# Patient Record
Sex: FEMALE | Race: WHITE | URBAN - METROPOLITAN AREA
[De-identification: names, ages, dates, MRNs, and addresses within clinical notes are randomized per-mention and may not be internally consistent; named-entity substitution may affect disease eponyms.]

---

## 2017-08-11 ENCOUNTER — APPOINTMENT (OUTPATIENT)
Dept: URBAN - METROPOLITAN AREA CLINIC 292 | Age: 27
Setting detail: DERMATOLOGY
End: 2017-08-11

## 2017-08-11 DIAGNOSIS — L81.0 POSTINFLAMMATORY HYPERPIGMENTATION: ICD-10-CM

## 2017-08-11 DIAGNOSIS — L72.0 EPIDERMAL CYST: ICD-10-CM

## 2017-08-11 DIAGNOSIS — L70.0 ACNE VULGARIS: ICD-10-CM

## 2017-08-11 PROCEDURE — OTHER PRESCRIPTION: OTHER

## 2017-08-11 PROCEDURE — OTHER INTRALESIONAL KENALOG: OTHER

## 2017-08-11 PROCEDURE — 11901 INJECT SKIN LESIONS >7: CPT

## 2017-08-11 PROCEDURE — OTHER ACNE SURGERY (MULTIPLE LESIONS): OTHER

## 2017-08-11 PROCEDURE — 10040 ACNE SURGERY: CPT

## 2017-08-11 PROCEDURE — 99202 OFFICE O/P NEW SF 15 MIN: CPT | Mod: 25

## 2017-08-11 PROCEDURE — OTHER AFA CHEMICAL PEEL: OTHER

## 2017-08-11 PROCEDURE — OTHER COUNSELING: OTHER

## 2017-08-11 RX ORDER — DOXYCYCLINE 100 MG/1
CAPSULE ORAL
Qty: 60 | Refills: 3 | Status: ERX | COMMUNITY
Start: 2017-08-11

## 2017-08-11 RX ORDER — TRETINOIN 0.25 MG/G
CREAM TOPICAL
Qty: 1 | Refills: 5 | Status: ERX | COMMUNITY
Start: 2017-08-11

## 2017-08-11 RX ORDER — CLINDAMYCIN PHOSPHATE 10 MG/ML
SOLUTION TOPICAL
Qty: 1 | Refills: 5 | Status: ERX | COMMUNITY
Start: 2017-08-11

## 2017-08-11 RX ORDER — BENZOYL PEROXIDE 5 G/100G
GEL TOPICAL
Qty: 1 | Refills: 5 | Status: ERX | COMMUNITY
Start: 2017-08-11

## 2017-08-11 ASSESSMENT — LOCATION SIMPLE DESCRIPTION DERM
LOCATION SIMPLE: RIGHT CHEEK
LOCATION SIMPLE: LEFT CHEEK
LOCATION SIMPLE: CHIN

## 2017-08-11 ASSESSMENT — LOCATION DETAILED DESCRIPTION DERM
LOCATION DETAILED: RIGHT MEDIAL MANDIBULAR CHEEK
LOCATION DETAILED: RIGHT CHIN
LOCATION DETAILED: LEFT CHIN
LOCATION DETAILED: LEFT INFERIOR CENTRAL MALAR CHEEK
LOCATION DETAILED: RIGHT MENTAL CREASE
LOCATION DETAILED: LEFT INFERIOR CENTRAL BUCCAL CHEEK
LOCATION DETAILED: LEFT CENTRAL BUCCAL CHEEK
LOCATION DETAILED: LEFT CENTRAL MALAR CHEEK
LOCATION DETAILED: RIGHT LATERAL BUCCAL CHEEK

## 2017-08-11 ASSESSMENT — LOCATION ZONE DERM: LOCATION ZONE: FACE

## 2017-08-11 NOTE — PROCEDURE: AFA CHEMICAL PEEL
Consent: Prior to the procedure, written consent was obtained and risks were reviewed, including but not limited to: redness, peeling, blistering, pigmentary change, scarring, infection, and pain.
Post Peel Care: After the procedure, a post-peel cream was applied to the treated areas. Sun protection and post-care instructions were reviewed with the patient.
Post-Care Instructions: I reviewed with the patient in detail post-care instructions. Patient should avoid sun exposure and wear sun protection.
Treatment Number: 0
Prep: The treated area was degreased with pre-peel cleanser, and vaseline was applied for protection of mucous membranes.
Chemical Peel: AFA Facial Peel-30
Detail Level: Zone

## 2017-08-11 NOTE — PROCEDURE: INTRALESIONAL KENALOG
Medical Necessity Clause: This procedure was medically necessary because the lesions that were treated were:
X Size Of Lesion In Cm (Optional): 0
Kenalog Preparation: Kenalog
Consent: The risks of atrophy were reviewed with the patient.
Include Z78.9 (Other Specified Conditions Influencing Health Status) As An Associated Diagnosis?: No
Concentration Of Solution Injected (Mg/Ml): 2.5
Total Volume Injected (Ccs- Only Use Numbers And Decimals): .8
Detail Level: Detailed

## 2017-08-11 NOTE — PROCEDURE: ACNE SURGERY (MULTIPLE LESIONS)
Acne Type: Comedonal Lesions
Detail Level: Detailed
Post-Care Instructions: I reviewed with the patient in detail post-care instructions. Patient is to keep the treatment areas dry overnight, and then apply bacitracin twice daily until healed. Patient may apply hydrogen peroxide soaks to remove any crusting.
Prep Text (Optional): Prior to removal the treatment areas were prepped in the usual fashion.
Consent was obtained and risks were reviewed including but not limited to scarring, infection, bleeding, scabbing, incomplete removal, and allergy to anesthesia.
Render Post-Care Instructions In Note?: no
Extraction Method: 11 blade and comedone extractor
Total Number Of Lesions Treated: 5

## 2017-09-15 ENCOUNTER — APPOINTMENT (OUTPATIENT)
Dept: URBAN - METROPOLITAN AREA CLINIC 292 | Age: 27
Setting detail: DERMATOLOGY
End: 2017-09-15

## 2017-09-15 DIAGNOSIS — L72.0 EPIDERMAL CYST: ICD-10-CM

## 2017-09-15 DIAGNOSIS — L70.0 ACNE VULGARIS: ICD-10-CM

## 2017-09-15 DIAGNOSIS — L81.0 POSTINFLAMMATORY HYPERPIGMENTATION: ICD-10-CM

## 2017-09-15 PROCEDURE — 11901 INJECT SKIN LESIONS >7: CPT

## 2017-09-15 PROCEDURE — 99213 OFFICE O/P EST LOW 20 MIN: CPT | Mod: 25

## 2017-09-15 PROCEDURE — OTHER COUNSELING: OTHER

## 2017-09-15 PROCEDURE — 10040 ACNE SURGERY: CPT

## 2017-09-15 PROCEDURE — OTHER ACNE SURGERY (MULTIPLE LESIONS): OTHER

## 2017-09-15 PROCEDURE — OTHER INTRALESIONAL KENALOG: OTHER

## 2017-09-15 PROCEDURE — OTHER AFA CHEMICAL PEEL: OTHER

## 2017-09-15 PROCEDURE — OTHER PRESCRIPTION: OTHER

## 2017-09-15 RX ORDER — BENZOYL PEROXIDE 100 MG/ML
LIQUID TOPICAL
Qty: 1 | Refills: 4 | Status: ERX | COMMUNITY
Start: 2017-09-15

## 2017-09-15 ASSESSMENT — LOCATION DETAILED DESCRIPTION DERM
LOCATION DETAILED: RIGHT LATERAL BUCCAL CHEEK
LOCATION DETAILED: RIGHT CHIN
LOCATION DETAILED: LEFT CENTRAL BUCCAL CHEEK
LOCATION DETAILED: LEFT INFERIOR CENTRAL BUCCAL CHEEK
LOCATION DETAILED: LEFT CENTRAL MALAR CHEEK
LOCATION DETAILED: RIGHT MEDIAL MANDIBULAR CHEEK
LOCATION DETAILED: LEFT CHIN
LOCATION DETAILED: LEFT INFERIOR CENTRAL MALAR CHEEK
LOCATION DETAILED: RIGHT MENTAL CREASE

## 2017-09-15 ASSESSMENT — LOCATION ZONE DERM: LOCATION ZONE: FACE

## 2017-09-15 ASSESSMENT — LOCATION SIMPLE DESCRIPTION DERM
LOCATION SIMPLE: LEFT CHEEK
LOCATION SIMPLE: CHIN
LOCATION SIMPLE: RIGHT CHEEK

## 2017-09-15 NOTE — PROCEDURE: AFA CHEMICAL PEEL
Consent: Prior to the procedure, written consent was obtained and risks were reviewed, including but not limited to: redness, peeling, blistering, pigmentary change, scarring, infection, and pain.
Post Peel Care: After the procedure, a post-peel cream was applied to the treated areas. Sun protection and post-care instructions were reviewed with the patient.
Post-Care Instructions: I reviewed with the patient in detail post-care instructions. Patient should avoid sun exposure and wear sun protection.
Detail Level: Zone
Chemical Peel: AFA Facial Peel-30
Treatment Number: 0
Prep: The treated area was degreased with pre-peel cleanser, and vaseline was applied for protection of mucous membranes.

## 2017-09-15 NOTE — PROCEDURE: INTRALESIONAL KENALOG
X Size Of Lesion In Cm (Optional): 0
Kenalog Preparation: Kenalog
Total Volume Injected (Ccs- Only Use Numbers And Decimals): .8
Consent: The risks of atrophy were reviewed with the patient.
Concentration Of Solution Injected (Mg/Ml): 2.5
Detail Level: Detailed
Medical Necessity Clause: This procedure was medically necessary because the lesions that were treated were:
Include Z78.9 (Other Specified Conditions Influencing Health Status) As An Associated Diagnosis?: No

## 2017-11-10 ENCOUNTER — APPOINTMENT (OUTPATIENT)
Dept: URBAN - METROPOLITAN AREA CLINIC 292 | Age: 27
Setting detail: DERMATOLOGY
End: 2017-11-10

## 2017-11-10 DIAGNOSIS — L70.0 ACNE VULGARIS: ICD-10-CM

## 2017-11-10 DIAGNOSIS — L81.0 POSTINFLAMMATORY HYPERPIGMENTATION: ICD-10-CM

## 2017-11-10 DIAGNOSIS — L72.0 EPIDERMAL CYST: ICD-10-CM

## 2017-11-10 PROCEDURE — 10040 ACNE SURGERY: CPT

## 2017-11-10 PROCEDURE — OTHER COUNSELING: OTHER

## 2017-11-10 PROCEDURE — OTHER PRESCRIPTION: OTHER

## 2017-11-10 PROCEDURE — OTHER ACNE SURGERY (MULTIPLE LESIONS): OTHER

## 2017-11-10 PROCEDURE — 99213 OFFICE O/P EST LOW 20 MIN: CPT | Mod: 25

## 2017-11-10 PROCEDURE — OTHER CHEMICAL PEEL GLYCOLIC ACID: OTHER

## 2017-11-10 RX ORDER — DOXYCYCLINE 100 MG/1
CAPSULE ORAL
Qty: 60 | Refills: 3 | Status: ERX

## 2017-11-10 ASSESSMENT — LOCATION DETAILED DESCRIPTION DERM
LOCATION DETAILED: LEFT CENTRAL MALAR CHEEK
LOCATION DETAILED: LEFT INFERIOR CENTRAL MALAR CHEEK
LOCATION DETAILED: SUPERIOR MID FOREHEAD

## 2017-11-10 ASSESSMENT — LOCATION SIMPLE DESCRIPTION DERM
LOCATION SIMPLE: LEFT CHEEK
LOCATION SIMPLE: SUPERIOR FOREHEAD

## 2017-11-10 ASSESSMENT — LOCATION ZONE DERM: LOCATION ZONE: FACE

## 2017-11-10 NOTE — PROCEDURE: ACNE SURGERY (MULTIPLE LESIONS)
Detail Level: Detailed
Extraction Method: 11 blade and comedone extractor
Total Number Of Lesions Treated: 5
Acne Type: Comedonal Lesions
Render Number Of Lesions Treated: no
Consent was obtained and risks were reviewed including but not limited to scarring, infection, bleeding, scabbing, incomplete removal, and allergy to anesthesia.
Post-Care Instructions: I reviewed with the patient in detail post-care instructions. Patient is to keep the treatment areas dry overnight, and then apply bacitracin twice daily until healed. Patient may apply hydrogen peroxide soaks to remove any crusting.
Prep Text (Optional): Prior to removal the treatment areas were prepped in the usual fashion.

## 2017-11-10 NOTE — PROCEDURE: CHEMICAL PEEL GLYCOLIC ACID
Detail Level: Zone
Prep: The treated area was degreased with pre-peel cleanser, and vaseline was applied for protection of mucous membranes.
Time (Mins): 1
Treatment Number: 0
Post Peel Care: The peel was neutralized with sodium bicarbonate.  After the procedure, the treatment area was washed with soap and water, and a post-peel cream was applied. Sun protection and post-care instructions were reviewed with the patient.
Consent: Prior to the procedure, written consent was obtained and risks were reviewed, including but not limited to: redness, peeling, blistering, pigmentary change, scarring, infection, and pain.
Glycolic Acid %: 30%
Post-Care Instructions: I reviewed with the patient in detail post-care instructions. Patient should avoid sun exposure and wear sun protection.

## 2021-02-20 ENCOUNTER — HOSPITAL ENCOUNTER (EMERGENCY)
Age: 31
Discharge: HOME | End: 2021-02-20
Attending: EMERGENCY MEDICINE
Payer: COMMERCIAL

## 2021-02-20 ENCOUNTER — APPOINTMENT (OUTPATIENT)
Dept: GENERAL RADIOLOGY | Age: 31
End: 2021-02-20
Payer: COMMERCIAL

## 2021-02-20 VITALS
SYSTOLIC BLOOD PRESSURE: 106 MMHG | DIASTOLIC BLOOD PRESSURE: 76 MMHG | OXYGEN SATURATION: 100 % | BODY MASS INDEX: 19.7 KG/M2 | RESPIRATION RATE: 16 BRPM | HEIGHT: 68 IN | HEART RATE: 87 BPM | TEMPERATURE: 97.9 F | WEIGHT: 130 LBS

## 2021-02-20 DIAGNOSIS — N94.6 DYSMENORRHEA: ICD-10-CM

## 2021-02-20 DIAGNOSIS — K59.00 CONSTIPATION, UNSPECIFIED CONSTIPATION TYPE: ICD-10-CM

## 2021-02-20 DIAGNOSIS — R10.9 NONSPECIFIC ABDOMINAL PAIN: Primary | ICD-10-CM

## 2021-02-20 LAB
AMORPHOUS: ABNORMAL
BACTERIA: ABNORMAL
BILIRUBIN URINE: ABNORMAL
BLOOD, URINE: ABNORMAL
CASTS UA: ABNORMAL /LPF
CHARACTER, URINE: ABNORMAL
COLOR: ABNORMAL
CRYSTALS, UA: ABNORMAL
EPITHELIAL CELLS, UA: ABNORMAL /HPF
GLUCOSE, URINE: NEGATIVE MG/DL
ICTOTEST: NEGATIVE
KETONES, URINE: ABNORMAL
LEUKOCYTE ESTERASE, URINE: NEGATIVE
MUCUS: ABNORMAL
NITRITE, URINE: NEGATIVE
PH UA: 5.5 (ref 5–9)
PREGNANCY, URINE: NEGATIVE
PROTEIN UA: 100 MG/DL
RBC UA: ABNORMAL /HPF
REFLEX TO URINE C & S: ABNORMAL
SPECIFIC GRAVITY UA: >= 1.03 (ref 1–1.03)
UROBILINOGEN, URINE: 0.2 EU/DL (ref 0–1)
WBC UA: ABNORMAL /HPF

## 2021-02-20 PROCEDURE — 6370000000 HC RX 637 (ALT 250 FOR IP): Performed by: EMERGENCY MEDICINE

## 2021-02-20 PROCEDURE — 74018 RADEX ABDOMEN 1 VIEW: CPT

## 2021-02-20 PROCEDURE — 81001 URINALYSIS AUTO W/SCOPE: CPT

## 2021-02-20 PROCEDURE — 99285 EMERGENCY DEPT VISIT HI MDM: CPT

## 2021-02-20 PROCEDURE — 84703 CHORIONIC GONADOTROPIN ASSAY: CPT

## 2021-02-20 RX ORDER — FERROUS SULFATE 325(65) MG
325 TABLET ORAL
COMMUNITY
End: 2022-09-20

## 2021-02-20 RX ORDER — IBUPROFEN 800 MG/1
800 TABLET ORAL ONCE
Status: COMPLETED | OUTPATIENT
Start: 2021-02-20 | End: 2021-02-20

## 2021-02-20 RX ORDER — FLUOXETINE HYDROCHLORIDE 20 MG/1
20 CAPSULE ORAL DAILY
COMMUNITY
End: 2022-09-20

## 2021-02-20 RX ORDER — POLYETHYLENE GLYCOL 3350 17 G/17G
17 POWDER, FOR SOLUTION ORAL DAILY
Qty: 1 BOTTLE | Refills: 0 | Status: SHIPPED | OUTPATIENT
Start: 2021-02-20 | End: 2021-02-27

## 2021-02-20 RX ADMIN — IBUPROFEN 800 MG: 800 TABLET, FILM COATED ORAL at 12:54

## 2021-02-20 ASSESSMENT — PAIN DESCRIPTION - FREQUENCY
FREQUENCY: CONTINUOUS
FREQUENCY: CONTINUOUS

## 2021-02-20 ASSESSMENT — ENCOUNTER SYMPTOMS
ABDOMINAL PAIN: 1
DIARRHEA: 0
NAUSEA: 0
SHORTNESS OF BREATH: 0
VOMITING: 0
BLOOD IN STOOL: 0
WHEEZING: 0
SORE THROAT: 0

## 2021-02-20 ASSESSMENT — PAIN DESCRIPTION - LOCATION
LOCATION: ABDOMEN
LOCATION: ABDOMEN

## 2021-02-20 ASSESSMENT — PAIN DESCRIPTION - ORIENTATION: ORIENTATION: LOWER

## 2021-02-20 ASSESSMENT — PAIN - FUNCTIONAL ASSESSMENT: PAIN_FUNCTIONAL_ASSESSMENT: 0-10

## 2021-02-20 ASSESSMENT — PAIN SCALES - GENERAL
PAINLEVEL_OUTOF10: 2
PAINLEVEL_OUTOF10: 2

## 2021-02-20 ASSESSMENT — PAIN DESCRIPTION - DIRECTION
RADIATING_TOWARDS: LOWER BACK
RADIATING_TOWARDS: LOWER BACK

## 2021-02-20 ASSESSMENT — PAIN DESCRIPTION - PAIN TYPE: TYPE: ACUTE PAIN

## 2021-02-20 NOTE — ED TRIAGE NOTES
Arrives to [facility] for the evaluation of lower abdominal cramping that started yesterday and worsening. Patient is on her last day of menstrual cycle. Denies fever, chills, diarrhea, nausea, vomiting, dysuria, foul smelling odor, flank pain, hematuria, pregnancy. Pain is rated 6/10 on scale and described as a constant cramping. Pain wraps around sides and into lower back. Has not taken any OTC pain relievers. Is passing gas but unsure of her last BM. States that sometimes when nervous will get an upset stomach. Is nervous about starting a new job soon. Did do some physical labor yesterday but states was not heavy lifting or chance of injury. Resting on cot at this time. Instructed patient that a urine specimen is needed. Call light in reach. Will monitor.

## 2021-02-20 NOTE — ED NOTES
Gave urine specimen, KUB completed. Received oral mediation for pain. At this time waiting results. Call light in reach. Patient declined warm blanket when offered. Will monitor.       Joe Rosario RN  02/20/21 8273

## 2021-02-20 NOTE — ED PROVIDER NOTES
[address]  [address] [place] [address] [address]  Phone: [phone]    eMERGENCY dEPARTMENT eNCOUnter           CHIEF COMPLAINT       Chief Complaint   Patient presents with    Abdominal Cramping       Nurses Notes reviewed and I agree except as noted in the HPI. HISTORY OF PRESENT ILLNESS    Aman Worley is a 35 y.o. female who presented via private vehicle with a chief complaint mentioned above. Symptoms started 2 days ago. She is complaining of mild intermittent sharp cramping across the lower abdomen and suprapubic area. She said that her pain was not relieved or exacerbated by anything. She is on her last day of her menstrual period. She also has constipation. Her last bowel movement was 4 days ago. She denies fever or chills. She denies nausea or vomiting. REVIEW OF SYSTEMS     Review of Systems   Constitutional: Negative for chills and fever. HENT: Negative for sore throat. Respiratory: Negative for shortness of breath and wheezing. Cardiovascular: Negative for chest pain and palpitations. Gastrointestinal: Positive for abdominal pain. Negative for blood in stool, diarrhea, nausea and vomiting. Genitourinary: Positive for vaginal bleeding. Negative for dysuria and hematuria. Musculoskeletal: Negative for neck pain and neck stiffness. Neurological: Negative for seizures, syncope and headaches. Psychiatric/Behavioral: Negative for confusion. PAST MEDICAL HISTORY    has a past medical history of Anxiety and Depression. SURGICAL HISTORY      has no past surgical history on file. CURRENT MEDICATIONS       Previous Medications    FERROUS SULFATE (IRON 325) 325 (65 FE) MG TABLET    Take 325 mg by mouth daily (with breakfast)    FLUOXETINE (PROZAC) 20 MG CAPSULE    Take 20 mg by mouth daily       ALLERGIES     has No Known Allergies. FAMILY HISTORY     has no family status information on file.     family history is not on file.    SOCIAL HISTORY      reports that she has never smoked. She has never used smokeless tobacco. She reports previous alcohol use. She reports that she does not use drugs. PHYSICAL EXAM     INITIAL VITALS:  height is 5' 8\" (1.727 m) and weight is 130 lb (59 kg). Her oral temperature is 97.9 °F (36.6 °C). Her blood pressure is 106/76 and her pulse is 102. Her respiration is 16 and oxygen saturation is 100%. Physical Exam   Constitutional: She appears well-developed and well-nourished. No distress. HENT:   Mouth/Throat: Oropharynx is clear and moist.   Eyes: Conjunctivae are normal. No scleral icterus. Neck: Neck supple. No JVD present. No tracheal deviation present. No thyromegaly present. Cardiovascular: Normal rate and regular rhythm. Exam reveals no gallop and no friction rub. No murmur heard. Pulmonary/Chest: Effort normal and breath sounds normal.   Abdominal: Soft. Bowel sounds are normal. She exhibits no distension. There is abdominal tenderness. There is no rebound. There is minimal tenderness to palpation of the suprapubic area. Mattson's and McBurney's signs are negative. Musculoskeletal:         General: No tenderness or edema. Neurological: She is alert. Psychiatric: She has a normal mood and affect. Nursing note and vitals reviewed. DIAGNOSTIC RESULTS       RADIOLOGY:   KUB was reported as unremarkable. I reviewed the x-ray myself, there is obvious constipation. LABS:   Labs Reviewed   URINE RT REFLEX TO CULTURE - Abnormal; Notable for the following components:       Result Value    Bilirubin Urine SMALL (*)     Ketones, Urine TRACE (*)     Blood, Urine LARGE (*)     Protein,  (*)     Color, UA DARK YELLOW (*)     Character, Urine CLOUDY (*)     All other components within normal limits   PREGNANCY, URINE   ICTOTEST, URINE   Urinalysis showed small amount of blood otherwise unremarkable  Pregnancy was negative.     EMERGENCY DEPARTMENT COURSE:   Vitals: Vitals:    02/20/21 1225   BP: 106/76   Pulse: 102   Resp: 16   Temp: 97.9 °F (36.6 °C)   TempSrc: Oral   SpO2: 100%   Weight: 130 lb (59 kg)   Height: 5' 8\" (1.727 m)     Patient received Motrin p.o. Reevaluation at 1:22 PM:  She was resting comfortably, she reported improvement. I discussed with her diagnosis and discharge instructions. Her abdominal examination was benign, there is no sign of acute abdomen such as appendicitis. FINAL IMPRESSION      1. Nonspecific abdominal pain    2. Dysmenorrhea    3. Constipation, unspecified constipation type          DISPOSITION/PLAN   Discharged home in good condition.     PATIENT REFERRED TO:  Carmelita Lema DO  [address] [place] [address]  [phone]    In 2 days        DISCHARGE MEDICATIONS:  New Prescriptions    POLYETHYLENE GLYCOL (MIRALAX) 17 GM/SCOOP POWDER    Take 17 g by mouth daily for 7 days PRN constipation       (Please note that portions of this note were completed with a voice recognition program.  Efforts were made to edit the dictations but occasionally words are mis-transcribed.)    MD Saul Lima MD  02/20/21 2120

## 2022-09-20 ENCOUNTER — HOSPITAL ENCOUNTER (EMERGENCY)
Age: 32
Discharge: HOME | End: 2022-09-20
Attending: EMERGENCY MEDICINE
Payer: MEDICARE

## 2022-09-20 ENCOUNTER — APPOINTMENT (OUTPATIENT)
Dept: GENERAL RADIOLOGY | Age: 32
End: 2022-09-20
Payer: MEDICARE

## 2022-09-20 VITALS
HEART RATE: 83 BPM | RESPIRATION RATE: 18 BRPM | OXYGEN SATURATION: 98 % | SYSTOLIC BLOOD PRESSURE: 125 MMHG | DIASTOLIC BLOOD PRESSURE: 81 MMHG | BODY MASS INDEX: 20.61 KG/M2 | TEMPERATURE: 97.1 F | WEIGHT: 136 LBS | HEIGHT: 68 IN

## 2022-09-20 DIAGNOSIS — S93.409A SPRAIN OF ANKLE, UNSPECIFIED LATERALITY, UNSPECIFIED LIGAMENT, INITIAL ENCOUNTER: ICD-10-CM

## 2022-09-20 DIAGNOSIS — S90.32XA CONTUSION OF LEFT FOOT, INITIAL ENCOUNTER: Primary | ICD-10-CM

## 2022-09-20 PROCEDURE — 73610 X-RAY EXAM OF ANKLE: CPT

## 2022-09-20 PROCEDURE — 99283 EMERGENCY DEPT VISIT LOW MDM: CPT

## 2022-09-20 PROCEDURE — 73630 X-RAY EXAM OF FOOT: CPT

## 2022-09-20 RX ORDER — PROPRANOLOL HYDROCHLORIDE 20 MG/1
TABLET ORAL
COMMUNITY
Start: 2022-09-12

## 2022-09-20 ASSESSMENT — PAIN DESCRIPTION - LOCATION
LOCATION: ANKLE;FOOT
LOCATION: FOOT;ANKLE

## 2022-09-20 ASSESSMENT — PAIN - FUNCTIONAL ASSESSMENT
PAIN_FUNCTIONAL_ASSESSMENT: 0-10
PAIN_FUNCTIONAL_ASSESSMENT: 0-10

## 2022-09-20 ASSESSMENT — PAIN DESCRIPTION - ORIENTATION
ORIENTATION: LEFT
ORIENTATION: LEFT;OUTER

## 2022-09-20 ASSESSMENT — PAIN SCALES - GENERAL
PAINLEVEL_OUTOF10: 4
PAINLEVEL_OUTOF10: 7

## 2022-09-20 NOTE — DISCHARGE INSTRUCTIONS
Return to the Emergency Department immediately if you develop worsening pain, swelling numbness, weakness , or you have any other concerns.   Please follow up with your occupational health doctor in 2-3 days if you continue to have pain despite icing and using Ibuprofen/Tylenol

## 2022-09-20 NOTE — ED NOTES
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 Pain and small amount of swelling in the lateral left foot. Neuro/circ status intact. Patient is alert and cooperative. Skin warm and dry. Color normal for ethnicity.      Charmayne Russel, RN  09/20/22 9025

## 2022-09-20 NOTE — ED NOTES
Discharge instructions reviewed with patient and questions answered. Skin warm and dry, color normal for ethnicity. Remains alert and cooperative. Denies further questions or concerns at this time.       Yonis Valle RN  09/20/22 5834

## 2022-09-20 NOTE — ED PROVIDER NOTES
[address] [facility]    EMERGENCY MEDICINE     Pt Name: Richardson Bumpers  MRN: [id number]  [place] 1990  Date of evaluation: 9/20/2022  Provider: Brielle Larson DO, [address]       Chief Complaint   Patient presents with    Foot Injury     Left        HISTORY OF PRESENT ILLNESS    Richardson Bumpers is a pleasant 33 y.o. 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 Any discrepancies are addressed above. PAST MEDICAL HISTORY     Past Medical History:   Diagnosis Date    Anxiety     Depression        SURGICAL HISTORY     History reviewed. No pertinent surgical history. CURRENT MEDICATIONS       Discharge Medication List as of 9/20/2022  9:30 AM        CONTINUE these medications which have NOT CHANGED    Details   propranolol (INDERAL) 20 MG tablet TAKE 1 TABLET BY MOUTH ONCE DAILY FOR TREMORHistorical Med             ALLERGIES     Patient has no known allergies. FAMILY HISTORY     History reviewed. No pertinent family history. SOCIAL HISTORY       Social History     Socioeconomic History    Marital status: Single     Spouse name: None    Number of children: None    Years of education: None    Highest education level: None   Tobacco Use    Smoking status: Never    Smokeless tobacco: Never   Vaping Use    Vaping Use: Never used   Substance and Sexual Activity    Alcohol use: Not Currently    Drug use: Never       REVIEW OF SYSTEMS     Review of Systems   Constitutional:  Negative for chills and fever. Musculoskeletal:         L ankle/foot pain     Except as noted above the remainder of the review of systems was reviewed and is.    PHYSICAL EXAM    (up to 7 for Final report electronically signed by Dr. Autumn Barron on 9/20/2022 8:44 AM          LABS:  Labs Reviewed - No data to display    All other labs were within normal range or not returned as of this dictation. Please note, any cultures that may have been sent were not resulted at the time of this patient visit. EMERGENCY DEPARTMENT COURSE andMedical Decision Making:     MDM  /   No radiographic evidence of fx  Able to ambulate  Does not want crutches  Tender at tibiotalar and talonavicular joint region  No instablity  Plan is to ice, antiinflammatories, if symptoms not improving follow up occ health. Strict returnprecautions and follow up instructions were discussed with the patient with which the patient agrees      ED Medications administered this visit:  Medications - No data to display      Procedures: (None if blank)         CLINICAL IMPRESSION     1. Contusion of left foot, initial encounter    2.  Sprain of ankle, unspecified laterality, unspecified ligament, initial encounter          DISPOSITION/PLAN   DISPOSITION Decision To Discharge 09/20/2022 09:13:51 AM      PATIENT REFERRED TO:  [address] [place] [address]    In 1 day      DISCHARGE MEDICATIONS:  Discharge Medication List as of 9/20/2022  9:30 AM              (Please note that portions of this note were completed with a voice recognition program.  Efforts were made to edit the dictations but occasionallywords are mis-transcribed.)      Jeremy Mcgee DO, FACEP (electronically signed)  Attending Physician, Emergency Department          [facility] [place], [place]  09/20/22 3118

## 2024-08-21 NOTE — PROCEDURE: ACNE SURGERY (MULTIPLE LESIONS)
Consent was obtained and risks were reviewed including but not limited to scarring, infection, bleeding, scabbing, incomplete removal, and allergy to anesthesia.
Render Post-Care Instructions In Note?: no
Clothing
Post-Care Instructions: I reviewed with the patient in detail post-care instructions. Patient is to keep the treatment areas dry overnight, and then apply bacitracin twice daily until healed. Patient may apply hydrogen peroxide soaks to remove any crusting.
Extraction Method: 11 blade and comedone extractor
Total Number Of Lesions Treated: 5
Acne Type: Comedonal Lesions
Prep Text (Optional): Prior to removal the treatment areas were prepped in the usual fashion.
Detail Level: Detailed